# Patient Record
Sex: MALE | Race: WHITE | NOT HISPANIC OR LATINO | URBAN - METROPOLITAN AREA
[De-identification: names, ages, dates, MRNs, and addresses within clinical notes are randomized per-mention and may not be internally consistent; named-entity substitution may affect disease eponyms.]

---

## 2017-04-10 ENCOUNTER — IMPORTED ENCOUNTER (OUTPATIENT)
Dept: URBAN - METROPOLITAN AREA CLINIC 50 | Facility: CLINIC | Age: 82
End: 2017-04-10

## 2017-05-22 ENCOUNTER — IMPORTED ENCOUNTER (OUTPATIENT)
Dept: URBAN - METROPOLITAN AREA CLINIC 50 | Facility: CLINIC | Age: 82
End: 2017-05-22

## 2018-06-04 ENCOUNTER — IMPORTED ENCOUNTER (OUTPATIENT)
Dept: URBAN - METROPOLITAN AREA CLINIC 50 | Facility: CLINIC | Age: 83
End: 2018-06-04

## 2019-07-15 ENCOUNTER — IMPORTED ENCOUNTER (OUTPATIENT)
Dept: URBAN - METROPOLITAN AREA CLINIC 50 | Facility: CLINIC | Age: 84
End: 2019-07-15

## 2020-11-20 ENCOUNTER — IMPORTED ENCOUNTER (OUTPATIENT)
Dept: URBAN - METROPOLITAN AREA CLINIC 50 | Facility: CLINIC | Age: 85
End: 2020-11-20

## 2021-04-18 ASSESSMENT — VISUAL ACUITY
OD_BAT: >20/400
OS_OTHER: 20/30. 20/50.
OD_PH: 20/40+
OS_CC: 20/20-
OS_CC: J1(+)
OD_BAT: 20/25
OD_BAT: 20/80
OS_BAT: 20/30
OD_OTHER: >20/400.
OD_CC: 20/25-
OS_CC: 20/25-
OD_CC: J1
OS_CC: J1
OD_CC: J1+
OD_OTHER: 20/25. 20/30.
OS_CC: 20/25-
OD_CC: J1(+)
OS_CC: 20/25
OD_CC: 20/25-
OD_BAT: 20/60
OD_OTHER: 20/80. 20/200.
OS_OTHER: 20/40. 20/80.
OD_CC: 20/40+
OD_CC: J1+
OD_OTHER: 20/60. >20/400.
OS_BAT: 20/40
OS_CC: J1+
OD_CC: 20/20-1
OS_CC: J1+

## 2021-04-18 ASSESSMENT — TONOMETRY
OD_IOP_MMHG: 15
OS_IOP_MMHG: 18
OS_IOP_MMHG: 16
OD_IOP_MMHG: 14
OD_IOP_MMHG: 15
OS_IOP_MMHG: 15
OS_IOP_MMHG: 14
OD_IOP_MMHG: 18

## 2021-08-19 ENCOUNTER — PREPPED CHART (OUTPATIENT)
Dept: URBAN - METROPOLITAN AREA CLINIC 53 | Facility: CLINIC | Age: 86
End: 2021-08-19

## 2021-08-20 ENCOUNTER — ANNUAL COMPREHENSIVE EXAM (OUTPATIENT)
Dept: URBAN - METROPOLITAN AREA CLINIC 53 | Facility: CLINIC | Age: 86
End: 2021-08-20

## 2021-08-20 DIAGNOSIS — H35.373: ICD-10-CM

## 2021-08-20 DIAGNOSIS — H43.813: ICD-10-CM

## 2021-08-20 DIAGNOSIS — H02.834: ICD-10-CM

## 2021-08-20 DIAGNOSIS — H26.491: ICD-10-CM

## 2021-08-20 DIAGNOSIS — H02.831: ICD-10-CM

## 2021-08-20 DIAGNOSIS — H35.363: ICD-10-CM

## 2021-08-20 DIAGNOSIS — H04.123: ICD-10-CM

## 2021-08-20 PROCEDURE — 92014 COMPRE OPH EXAM EST PT 1/>: CPT

## 2021-08-20 PROCEDURE — 92134 CPTRZ OPH DX IMG PST SGM RTA: CPT

## 2021-08-20 ASSESSMENT — VISUAL ACUITY
OD_SC: 20/150
OU_CC: J1+
OU_SC: 20/100
OS_SC: 20/100
OD_PH: 20/25
OS_CC: 20/20
OD_CC: 20/30
OD_GLARE: 20/80
OU_SC: J1+
OS_GLARE: 20/25
OD_GLARE: 20/30
OU_CC: 20/20-1
OS_GLARE: 20/25

## 2021-08-20 ASSESSMENT — TONOMETRY
OD_IOP_MMHG: 19
OD_IOP_MMHG: 21
OS_IOP_MMHG: 19
OS_IOP_MMHG: 18

## 2021-08-20 NOTE — PATIENT DISCUSSION
Significant dermatochalasis w/temporal hooding, OU. Patient is bothered by his droopy eyelids. Will send patient for Ptosis VF/Photos at his convenience.

## 2022-08-16 ENCOUNTER — COMPREHENSIVE EXAM (OUTPATIENT)
Dept: URBAN - METROPOLITAN AREA CLINIC 52 | Facility: CLINIC | Age: 87
End: 2022-08-16

## 2022-08-16 DIAGNOSIS — H35.373: ICD-10-CM

## 2022-08-16 DIAGNOSIS — H35.363: ICD-10-CM

## 2022-08-16 DIAGNOSIS — H26.491: ICD-10-CM

## 2022-08-16 DIAGNOSIS — Z96.1: ICD-10-CM

## 2022-08-16 PROCEDURE — 92134 CPTRZ OPH DX IMG PST SGM RTA: CPT

## 2022-08-16 PROCEDURE — 92014 COMPRE OPH EXAM EST PT 1/>: CPT

## 2022-08-16 PROCEDURE — 92015 DETERMINE REFRACTIVE STATE: CPT

## 2022-08-16 ASSESSMENT — VISUAL ACUITY
OS_CC: 20/25+1
OU_CC: 20/20-2
OD_PH: 20/30
OD_GLARE: 20/40
OD_CC: 20/30-1
OU_SC: J1+@14IN
OU_CC: J1@14IN
OS_GLARE: 20/40
OS_GLARE: 20/30
OD_GLARE: 20/80

## 2022-08-16 ASSESSMENT — TONOMETRY
OD_IOP_MMHG: 18
OS_IOP_MMHG: 17

## 2023-11-28 ENCOUNTER — COMPREHENSIVE EXAM (OUTPATIENT)
Dept: URBAN - METROPOLITAN AREA CLINIC 52 | Facility: CLINIC | Age: 88
End: 2023-11-28

## 2023-11-28 DIAGNOSIS — H35.373: ICD-10-CM

## 2023-11-28 DIAGNOSIS — Z96.1: ICD-10-CM

## 2023-11-28 PROCEDURE — 92014 COMPRE OPH EXAM EST PT 1/>: CPT

## 2023-11-28 PROCEDURE — 92134 CPTRZ OPH DX IMG PST SGM RTA: CPT

## 2023-11-28 PROCEDURE — 92015 DETERMINE REFRACTIVE STATE: CPT

## 2023-11-28 ASSESSMENT — KERATOMETRY
OD_AXISANGLE2_DEGREES: 88
OS_AXISANGLE_DEGREES: 163
OS_AXISANGLE2_DEGREES: 73
OD_K1POWER_DIOPTERS: 45.00
OS_K2POWER_DIOPTERS: 45.75
OD_K2POWER_DIOPTERS: 46.00
OS_K1POWER_DIOPTERS: 44.75
OD_AXISANGLE_DEGREES: 178

## 2023-11-28 ASSESSMENT — TONOMETRY
OS_IOP_MMHG: 17
OD_IOP_MMHG: 20

## 2023-11-28 ASSESSMENT — VISUAL ACUITY
OS_GLARE: 20/20
OD_GLARE: 20/25
OU_CC: J1+
OD_GLARE: 20/25
OS_GLARE: 20/20
OS_CC: 20/25
OU_CC: 20/25

## 2025-02-18 ENCOUNTER — COMPREHENSIVE EXAM (OUTPATIENT)
Age: OVER 89
End: 2025-02-18

## 2025-02-18 DIAGNOSIS — H35.373: ICD-10-CM

## 2025-02-18 DIAGNOSIS — H18.893: ICD-10-CM

## 2025-02-18 DIAGNOSIS — H26.491: ICD-10-CM

## 2025-02-18 DIAGNOSIS — H35.363: ICD-10-CM

## 2025-02-18 PROCEDURE — 92134 CPTRZ OPH DX IMG PST SGM RTA: CPT

## 2025-02-18 PROCEDURE — 99214 OFFICE O/P EST MOD 30 MIN: CPT
